# Patient Record
Sex: FEMALE | Race: WHITE | NOT HISPANIC OR LATINO | Employment: FULL TIME | URBAN - METROPOLITAN AREA
[De-identification: names, ages, dates, MRNs, and addresses within clinical notes are randomized per-mention and may not be internally consistent; named-entity substitution may affect disease eponyms.]

---

## 2018-01-13 NOTE — MISCELLANEOUS
Message  Spoke to patient by phone today and reviewed most recent Clomid course - she did have dominant follicle however day 21 progesterone level was low  Leticia reports that this was her third total course of Clomid (according to my notes it was her 4th) - regardless, I advised that she follows up with RE&I for further work up  She has [facility] info at home already and I will mail her the contact info for Dr Jacqueline Foss  Leticia will continue Provera 10x10 at least q3 mos for maintenance of endo lining given h/o oligoovulation  She was reminded to r/o preg prior to taking this  She also has flagyl with refills for PRN use given h/o recurrent vaginitis  All questions were answered and she is aware of reasons to call  Plan  PCOS (polycystic ovarian syndrome)    · MedroxyPROGESTERone Acetate 10 MG Oral Tablet (Provera); One tablet by  mouth daily x10 days  Repeat as needed every three months to induce withdrawal  bleed  Recurrent bacterial infection    · MetroNIDAZOLE 500 MG Oral Tablet;  Take 1 tablet twice daily    Signatures   Electronically signed by : ROXANA Simpson; Oct 12 2016  2:45PM EST                       (Author)

## 2018-01-15 NOTE — MISCELLANEOUS
Message   Recorded as Task   Date: 11/09/2016 01:19 PM, Created By: Donnell Shelton   Task Name: Miscellaneous   Assigned To: Ninfa Peggy   Regarding Patient: Susie Mcdonald, Status: In Progress   Comment:    Farida Adkins - 09 Nov 2016 1:19 PM     TASK CREATED  Caller: Self; Other; [phone] (Home)  Pt called and is waiting on referral for [facility] testing and a specialist referral from Mumtaz Mclain  Pt is @ [phone]   Ira Co - 09 Nov 2016 1:52 PM     TASK IN PROGRESS   Ira Co - 09 Nov 2016 1:53 PM     TASK EDITED                 please advise  thanks! 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)   4  Oligo-ovulation (628 0) (N97 0)   5  PCOS (polycystic ovarian syndrome) (256 4) (E28 2)   6  Recurrent bacterial infection (041 9) (A49 9)    Current Meds   1  MedroxyPROGESTERone Acetate 10 MG Oral Tablet (Provera); One tablet by mouth   daily x10 days  Repeat as needed every three months to induce withdrawal bleed; Therapy: 08Aug2016 to (Dotty Richardson)  Requested for: [id number]; Last   Rx:12Oct2016 Ordered   2  MetFORMIN HCl - 500 MG Oral Tablet; Therapy: (Recorded:08Aug2016) to Recorded   3  MetroNIDAZOLE 500 MG Oral Tablet; Take 1 tablet twice daily; Therapy: [id number] to (Evaluate:16Nov2016)  Requested for: 34UQA1319; Last   Rx:12Oct2016 Ordered   4  Prenatal Multi Natural TABS;    Therapy: (Recorded:18Kjv0719) to Recorded   5  Probiotic CAPS; Therapy: (Recorded:17Wwx9301) to Recorded   6  Vitamin D TABS; Therapy: (Recorded:19Uuh6495) to Recorded    Allergies    1   No Known Drug Allergies    Signatures   Electronically signed by : Llewellyn Crigler, LPN; Nov 9 7616  8:24RU EST                       (Author)

## 2019-08-26 ENCOUNTER — APPOINTMENT (OUTPATIENT)
Dept: URGENT CARE | Age: 34
End: 2019-08-26
Payer: OTHER MISCELLANEOUS

## 2019-08-26 PROCEDURE — 99283 EMERGENCY DEPT VISIT LOW MDM: CPT | Performed by: PHYSICIAN ASSISTANT

## 2019-08-26 PROCEDURE — G0382 LEV 3 HOSP TYPE B ED VISIT: HCPCS | Performed by: PHYSICIAN ASSISTANT

## 2020-05-25 ENCOUNTER — HOSPITAL ENCOUNTER (EMERGENCY)
Facility: HOSPITAL | Age: 35
Discharge: HOME | End: 2020-05-25
Attending: EMERGENCY MEDICINE | Admitting: EMERGENCY MEDICINE
Payer: COMMERCIAL

## 2020-05-25 VITALS
OXYGEN SATURATION: 99 % | WEIGHT: 190 LBS | HEART RATE: 111 BPM | RESPIRATION RATE: 18 BRPM | DIASTOLIC BLOOD PRESSURE: 74 MMHG | SYSTOLIC BLOOD PRESSURE: 133 MMHG | BODY MASS INDEX: 31.62 KG/M2 | TEMPERATURE: 98.5 F

## 2020-05-25 DIAGNOSIS — S61.211A LACERATION OF LEFT INDEX FINGER: Primary | ICD-10-CM

## 2020-05-25 PROCEDURE — 99282 EMERGENCY DEPT VISIT SF MDM: CPT | Performed by: EMERGENCY MEDICINE

## 2020-05-25 PROCEDURE — 12001 RPR S/N/AX/GEN/TRNK 2.5CM/<: CPT | Performed by: EMERGENCY MEDICINE

## 2020-05-25 PROCEDURE — 99282 EMERGENCY DEPT VISIT SF MDM: CPT
